# Patient Record
Sex: MALE | Race: WHITE | URBAN - METROPOLITAN AREA
[De-identification: names, ages, dates, MRNs, and addresses within clinical notes are randomized per-mention and may not be internally consistent; named-entity substitution may affect disease eponyms.]

---

## 2017-12-21 ENCOUNTER — PAT TELEPHONE (OUTPATIENT)
Dept: PREADMISSION TESTING | Age: 62
End: 2017-12-21

## 2017-12-21 VITALS — BODY MASS INDEX: 26.66 KG/M2 | HEIGHT: 69 IN | WEIGHT: 180 LBS

## 2017-12-21 NOTE — PROGRESS NOTES
[address] [facility] time___0800_________        Surgery time___0900_________    Take the following medications with a sip of water:    Do not eat or drink anything after 12:00 midnight prior to your surgery. EXCEPT PREP  This includes water chewing gum, mints and ice chips. You may brush your teeth and gargle the morning of your surgery, but do not swallow the water      You may be asked to stop blood thinners such as Coumadin, Plavix, Fragmin, Lovenox, etc., or any anti-inflammatories such as:  Aspirin, Ibuprofen, Advil, Naproxen prior to your surgery. We also ask that you stop any OTC medications such as fish oil, vitamin E, glucosamine, garlic, Multivitamins, COQ 10, etc.    We ask that you do not smoke 24 hours prior to surgery  We ask that you do not  drink any alcoholic beverages 24 hours prior to surgery     You must make arrangements for a responsible adult to take you home after your surgery. For your safety you will not be allowed to leave alone or drive yourself home. Your surgery will be cancelled if you do not have a ride home. Also for your safety, it is strongly suggested that someone stay with you the first 24 hours after your surgery. A parent or legal guardian must accompany a child scheduled for surgery and plan to stay at the hospital until the child is discharged. Please do not bring other children with you. For your comfort, please wear simple loose fitting clothing to the hospital.  Please do not bring valuables. Do not wear any make-up or nail polish on your fingers or toes      For your safety, please do not wear any jewelry or body piercing's on the day of surgery. All jewelry must be removed. If you have dentures, they will be removed before going to operating room. For your convenience, we will provide you with a container.     If you wear contact lenses or glasses, they will be removed, please bring a case for them.     If you have a living will and a durable power of  for healthcare, please bring in a copy. As part of our patient safety program to minimize surgical site infections, we ask you to do the following:    · Please notify your surgeon if you develop any illness between         now and the  day of your surgery. · This includes a cough, cold, fever, sore throat, nausea,         or vomiting, and diarrhea, etc.  ·  Please notify your surgeon if you experience dizziness, shortness         of breath or blurred vision between now and the time of your surgery. You may shower the night before surgery or the morning of   your surgery with an antibacterial soap. You will need to bring a photo ID and insurance card    [facility] has an onsite pharmacy, would you like to utilize our pharmacy     If you will be staying overnight and use a C-pap machine, please bring   your C-pap to hospital     Our goal is to provide you with excellent care, therefore, visitors will be limited to two(2) in the room at a time so that we may focus on providing this care for you. Please contact pre-admission testing if you have any further questions. Lainey Franco phone number:  [phone]  Please note these are generalized instructions for all surgical cases, you may be provided with more specific instructions according to your surgery.

## 2017-12-28 ENCOUNTER — SURG/PROC ORDERS (OUTPATIENT)
Dept: ANESTHESIOLOGY | Age: 62
End: 2017-12-28

## 2017-12-28 RX ORDER — SODIUM CHLORIDE 0.9 % (FLUSH) 0.9 %
10 SYRINGE (ML) INJECTION EVERY 12 HOURS SCHEDULED
Status: CANCELLED | OUTPATIENT
Start: 2017-12-28

## 2017-12-28 RX ORDER — SODIUM CHLORIDE 9 MG/ML
INJECTION, SOLUTION INTRAVENOUS CONTINUOUS
Status: CANCELLED | OUTPATIENT
Start: 2017-12-28

## 2017-12-28 RX ORDER — SODIUM CHLORIDE 0.9 % (FLUSH) 0.9 %
10 SYRINGE (ML) INJECTION PRN
Status: CANCELLED | OUTPATIENT
Start: 2017-12-28

## 2017-12-29 ENCOUNTER — HOSPITAL ENCOUNTER (OUTPATIENT)
Dept: ENDOSCOPY | Age: 62
Discharge: HOME | End: 2017-12-29
Attending: INTERNAL MEDICINE | Admitting: INTERNAL MEDICINE

## 2017-12-29 VITALS
BODY MASS INDEX: 26.66 KG/M2 | WEIGHT: 180 LBS | HEART RATE: 70 BPM | TEMPERATURE: 97.2 F | HEIGHT: 69 IN | OXYGEN SATURATION: 95 % | DIASTOLIC BLOOD PRESSURE: 89 MMHG | RESPIRATION RATE: 20 BRPM | SYSTOLIC BLOOD PRESSURE: 127 MMHG

## 2017-12-29 RX ORDER — SODIUM CHLORIDE 0.9 % (FLUSH) 0.9 %
10 SYRINGE (ML) INJECTION EVERY 12 HOURS SCHEDULED
Status: DISCONTINUED | OUTPATIENT
Start: 2017-12-29 | End: 2017-12-30 | Stop reason: HOSPADM

## 2017-12-29 RX ORDER — SODIUM CHLORIDE 0.9 % (FLUSH) 0.9 %
10 SYRINGE (ML) INJECTION PRN
Status: DISCONTINUED | OUTPATIENT
Start: 2017-12-29 | End: 2017-12-30 | Stop reason: HOSPADM

## 2017-12-29 RX ORDER — SODIUM CHLORIDE 9 MG/ML
INJECTION, SOLUTION INTRAVENOUS CONTINUOUS
Status: DISCONTINUED | OUTPATIENT
Start: 2017-12-29 | End: 2017-12-30 | Stop reason: HOSPADM

## 2017-12-29 RX ADMIN — SODIUM CHLORIDE: 9 INJECTION, SOLUTION INTRAVENOUS at 08:26

## 2017-12-29 ASSESSMENT — PAIN SCALES - WONG BAKER
WONGBAKER_NUMERICALRESPONSE: 0
WONGBAKER_NUMERICALRESPONSE: 0

## 2017-12-29 ASSESSMENT — PAIN SCALES - GENERAL: PAINLEVEL_OUTOF10: 0

## 2017-12-29 ASSESSMENT — PAIN - FUNCTIONAL ASSESSMENT: PAIN_FUNCTIONAL_ASSESSMENT: 0-10

## 2017-12-29 ASSESSMENT — LIFESTYLE VARIABLES: SMOKING_STATUS: 0

## 2017-12-29 NOTE — ANESTHESIA POST-OP
[facility] Department of Anesthesiology  Post-Anesthesia Note       Name:  Cheri Bledsoe                                  Age:  60 y.o. MRN:  [id number]     Last Vitals & Oxygen Saturation: /89   Pulse 70   Temp 97.2 °F (36.2 °C) (Tympanic)   Resp 20   Ht 5' 9\" (1.753 m)   Wt 180 lb (81.6 kg)   SpO2 95%   BMI 26.58 kg/m²   Patient Vitals for the past 4 hrs:   BP Temp Temp src Pulse Resp SpO2 Height Weight   12/29/17 0950 127/89 - - 70 20 95 % - -   12/29/17 0940 108/76 - - 72 20 93 % - -   12/29/17 0930 105/71 97.2 °F (36.2 °C) Tympanic 87 12 95 % - -   12/29/17 0819 (!) 144/93 97.9 °F (36.6 °C) Temporal 98 16 98 % 5' 9\" (1.753 m) 180 lb (81.6 kg)       Level of consciousness:  Awake, alert    Respiratory: Respirations easy, no distress. Stable. Cardiovascular: Hemodynamically stable. Hydration: Adequate. PONV: Adequately managed. Post-op pain: Adequately controlled. Post-op assessment: Tolerated anesthetic well without complication. Complications:  None.     Ulises Alas MD  December 29, 2017   10:32 AM

## 2017-12-29 NOTE — PROCEDURES
[facility]  Endoscopy Note    Patient: Franc Segura  : 1955  Acct#: [de-identified]    Procedure: Colonoscopy     Date:  2017    Surgeon:  Solitario Turner MD    Referring Physician:  Tania Lainez    Previous Colonoscopy: Yes  Date: unknown  Greater than 3 years? Yes    Preoperative Diagnosis:  surveillance    Postoperative Diagnosis:  Normal colon    Anesthesia:  See anesthesia note    Indications: This is a 60y.o. year old male who presents today with previous adenomatous polyp. Procedure: An informed consent was obtained from the patient after explanation of indications, benefits, possible risks and complications of the procedure. The patient was then taken to the endoscopy suite, placed in the left lateral decubitus position, and the above IV anesthesia was administered. A digital rectal examination was performed and revealed negative without mass, lesions or tenderness. The Olympus CFQ-180-AL video colonoscope was placed in the patient's rectum under digital direction and advanced to the cecum. The cecum was identified by characteristic anatomy and ballottment. The prep was excellent. The ileocecal valve was identified. The scope was then withdrawn back through the cecum, ascending, transverse, descending and sigmoid colons. Carefull circumferential examination of the mucosa in these areas demonstrated normal colonic mucosa throughout. The scope was then withdrawn into the rectum and retroflexed. The retroflexed view of the anal verge and rectum demonstrates no abnormalities. The scope was straightened, the colon was decompressed and the scope was withdrawn from the patient. The patient tolerated the procedure well and was taken to the PACU in good condition. Estimated Blood Loss:  none    Impression: Normal colon    Recommendations:  Repeat colonoscopy in 5 years.     Solitario uTrner MD   [facility]  2017
